# Patient Record
Sex: FEMALE | Race: AMERICAN INDIAN OR ALASKA NATIVE
[De-identification: names, ages, dates, MRNs, and addresses within clinical notes are randomized per-mention and may not be internally consistent; named-entity substitution may affect disease eponyms.]

---

## 2022-07-09 ENCOUNTER — HOSPITAL ENCOUNTER (EMERGENCY)
Dept: HOSPITAL 5 - ED | Age: 32
Discharge: HOME | End: 2022-07-09
Payer: COMMERCIAL

## 2022-07-09 VITALS — SYSTOLIC BLOOD PRESSURE: 121 MMHG | DIASTOLIC BLOOD PRESSURE: 73 MMHG

## 2022-07-09 DIAGNOSIS — R07.9: Primary | ICD-10-CM

## 2022-07-09 DIAGNOSIS — M79.10: ICD-10-CM

## 2022-07-09 DIAGNOSIS — M54.2: ICD-10-CM

## 2022-07-09 PROCEDURE — 71046 X-RAY EXAM CHEST 2 VIEWS: CPT

## 2022-07-09 PROCEDURE — 99283 EMERGENCY DEPT VISIT LOW MDM: CPT

## 2022-07-09 PROCEDURE — 93005 ELECTROCARDIOGRAM TRACING: CPT

## 2022-07-09 NOTE — XRAY REPORT
CHEST 2 VIEWS 



INDICATION / CLINICAL INFORMATION: chest pain.



COMPARISON: None available.



FINDINGS:



SUPPORT DEVICES: None.

HEART / MEDIASTINUM: No significant abnormality. 

LUNGS / PLEURA: No significant pulmonary or pleural abnormality. No pneumothorax. 



ADDITIONAL FINDINGS: No significant additional findings.



IMPRESSION:

1. No acute findings.



Signer Name: Audi Lindsay MD 

Signed: 7/9/2022 1:35 PM

Workstation Name: [email or web address]-
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
## 2022-07-10 NOTE — ELECTROCARDIOGRAPH REPORT
[facility]

                                       

Test Date:    2022               Test Time:    13:09:20

Pat Name:     UZAIR HERRERA        Department:   

Patient ID:   [id number]          Room:          

Gender:       F                        Technician:   SHAY

:          1990               Requested By: ED DOC

Order Number: [id number]              Reading MD:   Ila Linares

                                 Measurements

Intervals                              Axis          

Rate:         73                       P:            34

NC:           105                      QRS:          58

QRSD:         90                       T:            35

QT:           358                                    

QTc:          395                                    

                           Interpretive Statements

Sinus rhythm

ST elev, probable normal early repol pattern

No previous ECG available for comparison

Electronically Signed On 7- 19:46:45 EDT by Ila Linares